# Patient Record
Sex: FEMALE | Race: OTHER | Employment: FULL TIME | URBAN - METROPOLITAN AREA
[De-identification: names, ages, dates, MRNs, and addresses within clinical notes are randomized per-mention and may not be internally consistent; named-entity substitution may affect disease eponyms.]

---

## 2021-04-30 ENCOUNTER — TRANSCRIBE ORDER (OUTPATIENT)
Dept: SCHEDULING | Age: 61
End: 2021-04-30

## 2021-04-30 DIAGNOSIS — N18.30 CHRONIC KIDNEY DISEASE, STAGE 3 (HCC): Primary | ICD-10-CM

## 2021-05-19 ENCOUNTER — HOSPITAL ENCOUNTER (OUTPATIENT)
Dept: ULTRASOUND IMAGING | Age: 61
Discharge: HOME | End: 2021-05-19
Attending: NURSE PRACTITIONER
Payer: COMMERCIAL

## 2021-05-19 DIAGNOSIS — N18.30 CHRONIC KIDNEY DISEASE, STAGE 3 (HCC): ICD-10-CM

## 2021-05-19 PROCEDURE — 76770 US EXAM ABDO BACK WALL COMP: CPT

## 2021-09-08 ENCOUNTER — TELEPHONE (OUTPATIENT)
Dept: DIABETES SERVICES | Age: 61
End: 2021-09-08

## 2021-09-08 NOTE — TELEPHONE ENCOUNTER
Patient was a no show for Diabetes Assessment two hour session today. Called patient using  [facility] Id # [id number]. Patient reports she forgot her appointment.  Rescheduled for October 14, 2021 at 7:30 am to 9:30 AM.

## 2021-10-14 ENCOUNTER — DOCUMENTATION ONLY (OUTPATIENT)
Dept: DIABETES SERVICES | Age: 61
End: 2021-10-14

## 2021-10-14 NOTE — PROGRESS NOTES
Patient was a no show for her Diabetes Assessment again today. This is her second time to no show and no prior notification to cancel the appointment was received. Will close Diabetes file. Will notify referring.

## 2022-04-27 ENCOUNTER — TRANSCRIBE ORDER (OUTPATIENT)
Dept: SCHEDULING | Age: 62
End: 2022-04-27

## 2022-04-27 DIAGNOSIS — Z12.31 ENCOUNTER FOR SCREENING MAMMOGRAM FOR MALIGNANT NEOPLASM OF BREAST: Primary | ICD-10-CM

## 2022-05-11 ENCOUNTER — HOSPITAL ENCOUNTER (OUTPATIENT)
Dept: MAMMOGRAPHY | Age: 62
Discharge: HOME | End: 2022-05-11
Attending: NURSE PRACTITIONER
Payer: COMMERCIAL

## 2022-05-11 DIAGNOSIS — Z12.31 ENCOUNTER FOR SCREENING MAMMOGRAM FOR MALIGNANT NEOPLASM OF BREAST: ICD-10-CM

## 2022-05-11 PROCEDURE — 77067 SCR MAMMO BI INCL CAD: CPT

## 2023-02-28 ENCOUNTER — OFFICE VISIT (OUTPATIENT)
Dept: ENDOCRINOLOGY | Age: 63
End: 2023-02-28
Payer: MEDICAID

## 2023-02-28 VITALS
HEART RATE: 68 BPM | BODY MASS INDEX: 27.79 KG/M2 | DIASTOLIC BLOOD PRESSURE: 72 MMHG | OXYGEN SATURATION: 95 % | WEIGHT: 167 LBS | SYSTOLIC BLOOD PRESSURE: 139 MMHG

## 2023-02-28 DIAGNOSIS — E11.9 TYPE 2 DIABETES MELLITUS WITHOUT COMPLICATION, WITH LONG-TERM CURRENT USE OF INSULIN (HCC): Primary | ICD-10-CM

## 2023-02-28 DIAGNOSIS — E78.2 MIXED HYPERLIPIDEMIA: ICD-10-CM

## 2023-02-28 DIAGNOSIS — Z79.4 TYPE 2 DIABETES MELLITUS WITHOUT COMPLICATION, WITH LONG-TERM CURRENT USE OF INSULIN (HCC): Primary | ICD-10-CM

## 2023-02-28 DIAGNOSIS — I10 ESSENTIAL (PRIMARY) HYPERTENSION: ICD-10-CM

## 2023-02-28 LAB — HBA1C MFR BLD: 11.7 %

## 2023-02-28 PROCEDURE — 99214 OFFICE O/P EST MOD 30 MIN: CPT | Performed by: PHYSICIAN ASSISTANT

## 2023-02-28 PROCEDURE — 3075F SYST BP GE 130 - 139MM HG: CPT | Performed by: PHYSICIAN ASSISTANT

## 2023-02-28 PROCEDURE — 83036 HEMOGLOBIN GLYCOSYLATED A1C: CPT | Performed by: PHYSICIAN ASSISTANT

## 2023-02-28 PROCEDURE — 3078F DIAST BP <80 MM HG: CPT | Performed by: PHYSICIAN ASSISTANT

## 2023-02-28 RX ORDER — FLASH GLUCOSE SENSOR
KIT MISCELLANEOUS
Qty: 6 EACH | Refills: 3 | Status: SHIPPED | OUTPATIENT
Start: 2023-02-28

## 2023-02-28 RX ORDER — GLIPIZIDE 10 MG/1
10 TABLET ORAL DAILY
Qty: 90 TABLET | Refills: 3 | Status: SHIPPED | OUTPATIENT
Start: 2023-02-28

## 2023-02-28 RX ORDER — INSULIN DEGLUDEC INJECTION 100 U/ML
20 INJECTION, SOLUTION SUBCUTANEOUS DAILY
Qty: 18 ML | Refills: 3 | Status: SHIPPED | OUTPATIENT
Start: 2023-02-28

## 2023-02-28 RX ORDER — EMPAGLIFLOZIN 25 MG/1
25 TABLET, FILM COATED ORAL DAILY
Qty: 90 TABLET | Refills: 3 | Status: SHIPPED | OUTPATIENT
Start: 2023-02-28

## 2023-02-28 RX ORDER — SEMAGLUTIDE 2.68 MG/ML
2 INJECTION, SOLUTION SUBCUTANEOUS
Qty: 9 ML | Refills: 3 | Status: SHIPPED | OUTPATIENT
Start: 2023-02-28

## 2023-02-28 RX ORDER — ROSUVASTATIN CALCIUM 10 MG/1
10 TABLET, COATED ORAL
Qty: 30 TABLET | Refills: 3 | Status: SHIPPED | OUTPATIENT
Start: 2023-03-01

## 2023-02-28 NOTE — PROGRESS NOTES
[facility]    Alisia Post PA-C  [facility]  [address], [address]  [place], [address]  Phone [phone]  Facsimile [phone]          Jocelynn Burgos is a 60 y.o. female seen 2/28/2023 for follow up evaluation of type 2 diabetes        Assessment and Plan:      1. Type 2 diabetes mellitus without complication, with long-term current use of insulin (HCC)  Control is suboptimal.  Patient has a markedly elevated A1c relative to her last few weeks of readings by review of glucose logs. Suspect period of time she was out of GLP-1 heavily weighting her A1c. Now tolerating 1 mg Ozempic. Increase to 2 mg dose. Prescribe CGM therapy for more comprehensive blood glucose data. Reviewed correction scale, use 1 per 25 greater than 150 AC/at bedtime as needed    Exercise discussed, patient complains of bilateral hip pain and encouraged to discuss this with primary care      - Comprehensive Metabolic Panel; Future  - CBC with Auto Differential; Future  - Microalbumin / Creatinine Urine Ratio; Future  - Lipid Panel; Future  - Hemoglobin A1C; Future  - TSH with Reflex; Future  - Continuous Blood Gluc Sensor (FREESTYLE MAY 2 SENSOR) MISC; Use to monitor glucose E11.65  Dispense: 6 each; Refill: 3  - OZEMPIC, 2 MG/DOSE, 8 MG/3ML SOPN; Inject 2 mg into the skin every 7 days  Dispense: 9 mL; Refill: 3  - JARDIANCE 25 MG tablet; Take 1 tablet by mouth daily Take 25mg by mouth daily. Dispense: 90 tablet; Refill: 3  - TRESIBA FLEXTOUCH 100 UNIT/ML SOPN; Inject 20 Units into the skin daily  Dispense: 18 mL; Refill: 3  - glipiZIDE (GLUCOTROL) 10 MG tablet; Take 1 tablet by mouth daily  Dispense: 90 tablet; Refill: 3  - AMB POC HEMOGLOBIN A1C    2. Essential (primary) hypertension  BP Readings from Last 3 Encounters:   02/28/23 139/72   04/20/22 124/80   12/14/21 (!) 142/98         3.  Mixed hyperlipidemia  Last LDL WELL ABOVE goal currently on rosuvastatin - 10 MG 3 days per week. Importance of compliance reviewed, update fasting labs prior to next visit  Lab Results   Component Value Date    LDLCALC 242 (H) 07/23/2021       - Lipid Panel; Future  - rosuvastatin (CRESTOR) 10 MG tablet; Take 1 tablet by mouth three times a week  Dispense: 30 tablet; Refill: 3          Shavon was seen today for diabetes. Diagnoses and all orders for this visit:    Type 2 diabetes mellitus without complication, with long-term current use of insulin ([facility])  -     Comprehensive Metabolic Panel; Future  -     CBC with Auto Differential; Future  -     Microalbumin / Creatinine Urine Ratio; Future  -     Lipid Panel; Future  -     Hemoglobin A1C; Future  -     TSH with Reflex; Future  -     Continuous Blood Gluc Sensor (FREESTYLE MAY 2 SENSOR) [facility]; Use to monitor glucose E11.65  -     OZEMPIC, 2 MG/DOSE, 8 MG/3ML SOPN; Inject 2 mg into the skin every 7 days  -     JARDIANCE 25 MG tablet; Take 1 tablet by mouth daily Take 25mg by mouth daily. -     TRESIBA FLEXTOUCH 100 UNIT/ML SOPN; Inject 20 Units into the skin daily  -     glipiZIDE (GLUCOTROL) 10 MG tablet; Take 1 tablet by mouth daily  -     AMB POC HEMOGLOBIN A1C    Essential (primary) hypertension    Mixed hyperlipidemia  -     Lipid Panel; Future  -     rosuvastatin (CRESTOR) 10 MG tablet; Take 1 tablet by mouth three times a week          History of Present Illness:      2/28/2023   Interim diabetes HPI:  Not seen since April 2022. Returns to clinic for follow-up with markedly elevated A1c that is not consistent with her home glucose log. It becomes clear there was a prolonged period of time that she was without her GLP-1 therapy.   She has been making some lifestyle changes as well    Interim medical history changes:   -    Lifestyle Update:  Significant stress, death of nephew  Hip pain limiting activity    Current Regimen:  Tresiba 20 units, Ozempic 1mg Wednesday , Jardiance 25mg, glipizide 10mg      Glucose data:    Home blood glucose monitoring frequency: 2 times per day    By recall   Typical Standard Deviation   Fasting Low 100 As low as 83   AC lunch     AC supper     Bedtime High 100      Blood glucose levels are uncontrolled, by review of A1c      Failed past therapies:     Relevant co morbidities:    Denies HX pancreatitis, DKA, gastroparesis, foot ulcer    Optho:     Last eye exam was July 2022 and demonstrated no diabetic retinopathy. Eye care specialist is in [place]     Obesity:         Body mass index is 27.79 kg/m². decreasing steadily      Wt Readings from Last 3 Encounters:   02/28/23 167 lb (75.8 kg)   04/20/22 172 lb (78 kg)   12/14/21 179 lb (81.2 kg)         CardioVascular:    None       Renal:    Under care of nephro?  Yes  MACROALBUMIN     On ARB/ACE-I  lisinopril - 40 MG       NOT On  Kerendia   06/29/2020      Cr 0.76, GFR 85                           07/23/2021      Cr 1.55, GFR 36, microalbumin/Cr ratio 2444                           09/29/2021      Cr 1.57, GFR 35                          12/14/2021      Cr 1.54, GFR 36, microalbumin/Cr ratio 5986                             03/16/2022      Cr 2.19, GFR 25, microalbumin/Cr ratio 3341    01/16/2023 Cr 1.95, GFR 28, microalbumin/Cr ratio 3565       Lipids:     Current therapy rosuvastatin - 10 MG with good compliance 3 days per week       06/22/2020   TG- 90                           07/23/2021  TC- 348, LDL- 242, VLDL- 33,  HDL- 73, TG- 172            Lab Results   Component Value Date    CHOL 348 (H) 07/23/2021     Lab Results   Component Value Date    LDLCALC 242 (H) 07/23/2021      Lab Results   Component Value Date    VLDL 33 07/23/2021      Lab Results   Component Value Date    HDL 73 07/23/2021      Lab Results   Component Value Date    HDL 73 07/23/2021      Lab Results   Component Value Date    TRIG 172 (H) 07/23/2021     No results found for: CHOLHDLRATIO      Hemoglobin A1c:               12/04/2019      12.1% 08/10/2020      6.3%               07/23/2021      9.4%                12/14/2021      10.1%               04/20/2022     8.0%   02/28/2023 11.7%      Hemoglobin A1C, POC   Date Value Ref Range Status   02/28/2023 11.7 % Final   04/20/2022 8.0 % Final   12/14/2021 10.1 % Final        Thyroid:                                07/23/2021      1.800       Lab Results   Component Value Date/Time    TSH 1.800 07/23/2021 10:07 AM          Reviewed and updated this visit by provider:  Tobacco  Allergies  Meds  Problems  Med Hx  Surg Hx  Fam Hx                      Allergies & Medications:  Reviewed in chart. Review of Systems   Musculoskeletal:  Positive for arthralgias (bilateral hip). Vital Signs:  /72   Pulse 68   Wt 167 lb (75.8 kg)   SpO2 95%   BMI 27.79 kg/m²       Physical Exam  Constitutional:       Appearance: Normal appearance. HENT:      Head: Normocephalic. Neck:      Thyroid: No thyroid mass or thyromegaly. Vascular: No carotid bruit. Cardiovascular:      Rate and Rhythm: Normal rate and regular rhythm. Pulmonary:      Effort: Pulmonary effort is normal.      Breath sounds: Normal breath sounds. Abdominal:      Palpations: Abdomen is soft. Musculoskeletal:      Cervical back: Neck supple. Right lower leg: No edema. Left lower leg: No edema. Feet:      Right foot:      Protective Sensation: 3 sites tested. 3 sites sensed. Skin integrity: Skin integrity normal.      Left foot:      Protective Sensation: 3 sites tested. 3 sites sensed. Skin integrity: Skin integrity normal.   Lymphadenopathy:      Cervical: No cervical adenopathy. Skin:     General: Skin is warm and dry. Neurological:      General: No focal deficit present. Mental Status: She is alert. Sensory: Sensation is intact. Psychiatric:         Mood and Affect: Mood normal.         Behavior: Behavior normal.         Thought Content:  Thought content normal.   Judgment: Judgment normal.           Return in about 16 weeks (around 6/20/2023) for Diabetes DM2 Follow-Up.        Portions of this note were generated with the assistance of voice recogniton software.  As such, some errors in transcription may be present.

## 2023-05-10 ENCOUNTER — TRANSCRIBE ORDERS (OUTPATIENT)
Dept: SCHEDULING | Age: 63
End: 2023-05-10

## 2023-05-10 DIAGNOSIS — Z12.31 SCREENING MAMMOGRAM FOR HIGH-RISK PATIENT: Primary | ICD-10-CM

## 2023-05-17 ENCOUNTER — HOSPITAL ENCOUNTER (OUTPATIENT)
Dept: MAMMOGRAPHY | Age: 63
Discharge: HOME | End: 2023-05-20
Payer: COMMERCIAL

## 2023-05-17 DIAGNOSIS — Z12.31 SCREENING MAMMOGRAM FOR HIGH-RISK PATIENT: ICD-10-CM

## 2023-05-17 PROCEDURE — 77067 SCR MAMMO BI INCL CAD: CPT

## 2023-07-19 ENCOUNTER — HOME HEALTH ADMISSION (OUTPATIENT)
Dept: HOME HEALTH SERVICES | Facility: HOME HEALTH | Age: 63
End: 2023-07-19